# Patient Record
Sex: FEMALE | Race: OTHER | Employment: STUDENT | URBAN - METROPOLITAN AREA
[De-identification: names, ages, dates, MRNs, and addresses within clinical notes are randomized per-mention and may not be internally consistent; named-entity substitution may affect disease eponyms.]

---

## 2018-01-16 ENCOUNTER — OFFICE VISIT (OUTPATIENT)
Dept: FAMILY MEDICINE CLINIC | Facility: CLINIC | Age: 18
End: 2018-01-16

## 2018-01-16 VITALS
BODY MASS INDEX: 23.99 KG/M2 | HEART RATE: 68 BPM | HEIGHT: 65 IN | DIASTOLIC BLOOD PRESSURE: 67 MMHG | WEIGHT: 144 LBS | SYSTOLIC BLOOD PRESSURE: 107 MMHG

## 2018-01-16 DIAGNOSIS — E04.9 GOITER: Primary | ICD-10-CM

## 2018-01-16 DIAGNOSIS — Z00.00 ANNUAL PHYSICAL EXAM: ICD-10-CM

## 2018-01-16 PROCEDURE — 99202 OFFICE O/P NEW SF 15 MIN: CPT | Performed by: FAMILY MEDICINE

## 2018-01-16 PROCEDURE — 99212 OFFICE O/P EST SF 10 MIN: CPT | Performed by: FAMILY MEDICINE

## 2018-01-16 NOTE — PROGRESS NOTES
I did a physical and they said my thyroid was enlarged this year and last year. \"My dad had something wrong with his thyroid. \"  [employer] in [employer]. Periods regular.     Exam      Patient's past medical surgical family social history

## 2018-02-01 ENCOUNTER — HOSPITAL ENCOUNTER (OUTPATIENT)
Dept: ULTRASOUND IMAGING | Age: 18
Discharge: HOME | End: 2018-02-01
Attending: FAMILY MEDICINE
Payer: COMMERCIAL

## 2018-02-01 ENCOUNTER — APPOINTMENT (OUTPATIENT)
Dept: LAB | Age: 18
End: 2018-02-01
Attending: FAMILY MEDICINE
Payer: COMMERCIAL

## 2018-02-01 DIAGNOSIS — E04.9 GOITER: ICD-10-CM

## 2018-02-01 DIAGNOSIS — Z00.00 ANNUAL PHYSICAL EXAM: ICD-10-CM

## 2018-02-01 LAB
CHOLEST SERPL-MCNC: 155 MG/DL (ref 110–200)
GLUCOSE SERPL-MCNC: 81 MG/DL (ref 70–99)
HDLC SERPL-MCNC: 52 MG/DL
LDLC SERPL CALC-MCNC: 91 MG/DL (ref 0–99)
NONHDLC SERPL-MCNC: 103 MG/DL
T3 SERPL-MCNC: 1.05 NG/ML (ref 0.87–1.78)
T3FREE SERPL-MCNC: 3.34 PG/ML (ref 2.53–4.29)
T4 FREE SERPL-MCNC: 0.77 NG/DL (ref 0.58–1.64)
T4 SERPL-MCNC: 6.5 MCG/DL (ref 6.1–12.2)
TRIGL SERPL-MCNC: 61 MG/DL (ref 1–149)
TSH SERPL-ACNC: 0.92 UIU/ML (ref 0.45–5.33)

## 2018-02-01 PROCEDURE — 76536 US EXAM OF HEAD AND NECK: CPT | Performed by: FAMILY MEDICINE

## 2018-02-01 PROCEDURE — 84439 ASSAY OF FREE THYROXINE: CPT

## 2018-02-01 PROCEDURE — 86376 MICROSOMAL ANTIBODY EACH: CPT

## 2018-02-01 PROCEDURE — 84481 FREE ASSAY (FT-3): CPT

## 2018-02-01 PROCEDURE — 36415 COLL VENOUS BLD VENIPUNCTURE: CPT

## 2018-02-01 PROCEDURE — 82947 ASSAY GLUCOSE BLOOD QUANT: CPT

## 2018-02-01 PROCEDURE — 80061 LIPID PANEL: CPT

## 2018-02-01 PROCEDURE — 84443 ASSAY THYROID STIM HORMONE: CPT

## 2018-02-01 PROCEDURE — 84480 ASSAY TRIIODOTHYRONINE (T3): CPT

## 2018-02-01 PROCEDURE — 87389 HIV-1 AG W/HIV-1&-2 AB AG IA: CPT

## 2018-02-02 LAB
HIV1+2 AB SERPL QL IA: NONREACTIVE
THYROPEROXIDASE AB SERPL-ACNC: 0.7 IU/ML (ref 0–9)

## 2018-02-12 ENCOUNTER — OFFICE VISIT (OUTPATIENT)
Dept: FAMILY MEDICINE CLINIC | Facility: CLINIC | Age: 18
End: 2018-02-12

## 2018-02-12 VITALS
TEMPERATURE: 98 F | SYSTOLIC BLOOD PRESSURE: 109 MMHG | RESPIRATION RATE: 18 BRPM | HEART RATE: 59 BPM | DIASTOLIC BLOOD PRESSURE: 62 MMHG | BODY MASS INDEX: 24.22 KG/M2 | WEIGHT: 145.38 LBS | HEIGHT: 65 IN

## 2018-02-12 DIAGNOSIS — L65.9 HAIR LOSS: ICD-10-CM

## 2018-02-12 DIAGNOSIS — E04.2 MULTINODULAR GOITER: Primary | ICD-10-CM

## 2018-02-12 PROCEDURE — 99213 OFFICE O/P EST LOW 20 MIN: CPT | Performed by: FAMILY MEDICINE

## 2018-02-12 PROCEDURE — 99212 OFFICE O/P EST SF 10 MIN: CPT | Performed by: FAMILY MEDICINE

## 2018-02-12 NOTE — PROGRESS NOTES
Some hair loss  \"Its coming out in clumps. \"  Happened about a month ago. Pt has multinodular goiter with normal thyroid studies. Exam  Hair normal  Thyroid questionably enlarged. A/p  multinodular goiter  F/u 1 yea    Hair loss. Refer to derm.

## 2018-08-15 ENCOUNTER — TELEPHONE (OUTPATIENT)
Dept: FAMILY MEDICINE CLINIC | Facility: CLINIC | Age: 18
End: 2018-08-15

## 2018-08-15 DIAGNOSIS — E04.9 GOITER: Primary | ICD-10-CM

## 2018-08-15 NOTE — TELEPHONE ENCOUNTER
[facility] outpatient lab calling about an order not in system for Pt to get her thyroid checked.

## 2018-08-15 NOTE — TELEPHONE ENCOUNTER
Aia 16 please advise, last thyroid level 02/01/18 was normal and note indicates pt to return in 1 year.

## 2018-08-16 ENCOUNTER — TELEPHONE (OUTPATIENT)
Dept: FAMILY MEDICINE CLINIC | Facility: CLINIC | Age: 18
End: 2018-08-16

## 2018-08-16 NOTE — TELEPHONE ENCOUNTER
Pt is calling to ask if she can get a note stating why she didn't need a pathology report. pls advise.  Thank you

## 2018-08-16 NOTE — TELEPHONE ENCOUNTER
Patients [employer]  is requesting a letter stating that thyroid is not going to have an issue.  asked why a biopsy was not done. Please advise.

## 2018-08-17 ENCOUNTER — TELEPHONE (OUTPATIENT)
Dept: FAMILY MEDICINE CLINIC | Facility: CLINIC | Age: 18
End: 2018-08-17

## 2018-08-17 NOTE — TELEPHONE ENCOUNTER
Patient requesting to  copy of 02/01/2018 lab results from [address] [place] [address] office today if possible.      Patient is having Annika Liraes this up

## 2018-08-17 NOTE — TELEPHONE ENCOUNTER
Lab results printed and placed at  for  c/o Sgvani Lopez. Patient called and LMTCB for any further questions.

## 2018-12-27 ENCOUNTER — HOSPITAL ENCOUNTER (OUTPATIENT)
Age: 18
Discharge: HOME | End: 2018-12-27
Payer: COMMERCIAL

## 2018-12-27 VITALS
BODY MASS INDEX: 23.9 KG/M2 | OXYGEN SATURATION: 99 % | DIASTOLIC BLOOD PRESSURE: 62 MMHG | WEIGHT: 140 LBS | SYSTOLIC BLOOD PRESSURE: 115 MMHG | RESPIRATION RATE: 16 BRPM | TEMPERATURE: 98 F | HEART RATE: 62 BPM | HEIGHT: 64 IN

## 2018-12-27 DIAGNOSIS — R19.7 DIARRHEA, UNSPECIFIED TYPE: Primary | ICD-10-CM

## 2018-12-27 PROCEDURE — 81025 URINE PREGNANCY TEST: CPT

## 2018-12-27 PROCEDURE — 81003 URINALYSIS AUTO W/O SCOPE: CPT

## 2018-12-27 PROCEDURE — 99204 OFFICE O/P NEW MOD 45 MIN: CPT

## 2018-12-27 PROCEDURE — 99213 OFFICE O/P EST LOW 20 MIN: CPT

## 2018-12-27 RX ORDER — ONDANSETRON 4 MG/1
4 TABLET, ORALLY DISINTEGRATING ORAL ONCE
Status: COMPLETED | OUTPATIENT
Start: 2018-12-27 | End: 2018-12-27

## 2018-12-27 NOTE — ED INITIAL ASSESSMENT (HPI)
Mid abdominal pain for a few days, pain is mild now, reports diarrhea today. No dizziness or blood in stool. Reports slight nausea.  Periods have irregular since October

## 2018-12-27 NOTE — ED PROVIDER NOTES
Patient presents with:  Abdomen/Flank Pain (GI/)      HPI:     Chandler Alberts is a 19year old female who presents for evaluation of a chief complaint of GI distress.   Patient states she woke up with some upper epigastric discomfort and had a couple e Reviewed.       Physical Exam:     /62   Pulse 62   Temp 98.4 °F (36.9 °C) (Oral)   Resp 16   Ht 162.6 cm (5' 4\")   Wt 63.5 kg   LMP 12/27/2018   SpO2 99%   BMI 24.03 kg/m²   GENERAL: well developed, well nourished, well hydrated, no distress  SKIN: infection. The patient is aware. She was given Zofran and is feeling better. She states the pain to the epigastric area is intermittent.   She is aware this is most likely a virus, however if she develops any increasing abdominal pain, intractable vomiti

## 2020-03-25 ENCOUNTER — NURSE TRIAGE (OUTPATIENT)
Dept: FAMILY MEDICINE CLINIC | Facility: CLINIC | Age: 20
End: 2020-03-25

## 2020-03-25 NOTE — TELEPHONE ENCOUNTER
Action Requested: Summary for Provider     []  Critical Lab, Recommendations Needed  [] Need Additional Advice  []   FYI    []   Need Orders  [] Need Medications Sent to Pharmacy  []  Other     SUMMARY:  Patient states for the last month having left lower

## 2020-06-07 ENCOUNTER — LAB ENCOUNTER (OUTPATIENT)
Dept: LAB | Facility: HOSPITAL | Age: 20
End: 2020-06-07
Attending: OBSTETRICS & GYNECOLOGY
Payer: COMMERCIAL

## 2020-06-07 DIAGNOSIS — O20.0 THREATENED ABORTION: ICD-10-CM

## 2020-06-07 DIAGNOSIS — O20.0 THREATENED ABORTION: Primary | ICD-10-CM

## 2020-06-07 PROCEDURE — 86900 BLOOD TYPING SEROLOGIC ABO: CPT

## 2020-06-07 PROCEDURE — 84144 ASSAY OF PROGESTERONE: CPT

## 2020-06-07 PROCEDURE — 86850 RBC ANTIBODY SCREEN: CPT

## 2020-06-07 PROCEDURE — 84702 CHORIONIC GONADOTROPIN TEST: CPT

## 2020-06-07 PROCEDURE — 36415 COLL VENOUS BLD VENIPUNCTURE: CPT

## 2020-06-07 PROCEDURE — 86901 BLOOD TYPING SEROLOGIC RH(D): CPT

## 2020-08-12 ENCOUNTER — HOSPITAL ENCOUNTER (OUTPATIENT)
Age: 20
Discharge: HOME | End: 2020-08-12
Attending: EMERGENCY MEDICINE
Payer: COMMERCIAL

## 2020-08-12 VITALS
RESPIRATION RATE: 18 BRPM | WEIGHT: 145 LBS | BODY MASS INDEX: 24.16 KG/M2 | SYSTOLIC BLOOD PRESSURE: 124 MMHG | OXYGEN SATURATION: 98 % | HEIGHT: 65 IN | TEMPERATURE: 98 F | HEART RATE: 63 BPM | DIASTOLIC BLOOD PRESSURE: 71 MMHG

## 2020-08-12 DIAGNOSIS — Z20.822 EXPOSURE TO COVID-19 VIRUS: ICD-10-CM

## 2020-08-12 DIAGNOSIS — J02.0 STREPTOCOCCAL SORE THROAT: Primary | ICD-10-CM

## 2020-08-12 LAB — S PYO AG THROAT QL: POSITIVE

## 2020-08-12 PROCEDURE — 99213 OFFICE O/P EST LOW 20 MIN: CPT | Performed by: EMERGENCY MEDICINE

## 2020-08-12 PROCEDURE — U0003 INFECTIOUS AGENT DETECTION BY NUCLEIC ACID (DNA OR RNA); SEVERE ACUTE RESPIRATORY SYNDROME CORONAVIRUS 2 (SARS-COV-2) (CORONAVIRUS DISEASE [COVID-19]), AMPLIFIED PROBE TECHNIQUE, MAKING USE OF HIGH THROUGHPUT TECHNOLOGIES AS DESCRIBED BY CMS-2020-01-R: HCPCS | Performed by: EMERGENCY MEDICINE

## 2020-08-12 PROCEDURE — 87430 STREP A AG IA: CPT | Performed by: EMERGENCY MEDICINE

## 2020-08-12 RX ORDER — AMOXICILLIN 875 MG/1
875 TABLET, COATED ORAL 2 TIMES DAILY
Qty: 20 TABLET | Refills: 0 | Status: SHIPPED | OUTPATIENT
Start: 2020-08-12 | End: 2020-08-22

## 2020-08-12 NOTE — ED PROVIDER NOTES
Patient Seen in: [facility] In [address] [place] [address]    History   Patient presents with: Tonsillitis    Stated Complaint: sore throat, Swollen Tonsils    HPI    Patient here complaining of sore throat for 6 days.   Notes pain is described as sor bilateral  SKIN: good skin turgor, no obvious rashes  NECK: supple, no meningeal signs, b/l tender ant.  lymphadenopathy  CARDIO: Regular without murmur  EXTREMITIES: no cyanosis, clubbing or edema  GI: soft, non-tender, normal bowel sounds    DDX: pharyng

## 2020-08-12 NOTE — ED INITIAL ASSESSMENT (HPI)
Pt complaining of swollen tonsils for 2 weeks. No fever. Pt had a sore throat but that has subsided.

## 2020-08-13 LAB — SARS-COV-2 RNA RESP QL NAA+PROBE: NOT DETECTED

## 2020-11-24 ENCOUNTER — HOSPITAL ENCOUNTER (OUTPATIENT)
Age: 20
Discharge: HOME | End: 2020-11-24
Attending: EMERGENCY MEDICINE
Payer: COMMERCIAL

## 2020-11-24 VITALS
DIASTOLIC BLOOD PRESSURE: 57 MMHG | SYSTOLIC BLOOD PRESSURE: 117 MMHG | BODY MASS INDEX: 24.16 KG/M2 | HEART RATE: 63 BPM | HEIGHT: 65 IN | TEMPERATURE: 98 F | RESPIRATION RATE: 18 BRPM | OXYGEN SATURATION: 99 % | WEIGHT: 145 LBS

## 2020-11-24 DIAGNOSIS — Z20.822 ENCOUNTER FOR LABORATORY TESTING FOR COVID-19 VIRUS: Primary | ICD-10-CM

## 2020-11-24 DIAGNOSIS — J02.9 VIRAL PHARYNGITIS: ICD-10-CM

## 2020-11-24 PROCEDURE — 99213 OFFICE O/P EST LOW 20 MIN: CPT | Performed by: EMERGENCY MEDICINE

## 2020-11-24 PROCEDURE — 87880 STREP A ASSAY W/OPTIC: CPT | Performed by: EMERGENCY MEDICINE

## 2020-11-24 NOTE — ED PROVIDER NOTES
Patient Seen in: [facility]      History   Patient presents with:  Testing: Strep test? - Entered by patient    Stated Complaint: Sore Throat - Strep test?    HPI    Patient is a 22-year-old female who presents to immediate care with a sore t HENT:      Right Ear: Tympanic membrane normal.      Left Ear: Tympanic membrane normal.      Nose: Nose normal.      Mouth/Throat:      Mouth: Mucous membranes are moist.   Eyes:      Extraocular Movements: Extraocular movements intact.       Pupils: Pup

## 2021-06-04 ENCOUNTER — HOSPITAL ENCOUNTER (OUTPATIENT)
Age: 21
Discharge: HOME | End: 2021-06-04
Payer: COMMERCIAL

## 2021-06-04 VITALS
DIASTOLIC BLOOD PRESSURE: 80 MMHG | SYSTOLIC BLOOD PRESSURE: 127 MMHG | TEMPERATURE: 98 F | RESPIRATION RATE: 16 BRPM | HEIGHT: 65 IN | WEIGHT: 158 LBS | BODY MASS INDEX: 26.33 KG/M2 | HEART RATE: 63 BPM | OXYGEN SATURATION: 100 %

## 2021-06-04 DIAGNOSIS — R09.81 NASAL CONGESTION: ICD-10-CM

## 2021-06-04 DIAGNOSIS — J02.9 ACUTE PHARYNGITIS, UNSPECIFIED ETIOLOGY: Primary | ICD-10-CM

## 2021-06-04 PROCEDURE — 99213 OFFICE O/P EST LOW 20 MIN: CPT | Performed by: NURSE PRACTITIONER

## 2021-06-04 PROCEDURE — 87880 STREP A ASSAY W/OPTIC: CPT | Performed by: NURSE PRACTITIONER

## 2021-06-04 PROCEDURE — 36415 COLL VENOUS BLD VENIPUNCTURE: CPT | Performed by: NURSE PRACTITIONER

## 2021-06-04 PROCEDURE — 86308 HETEROPHILE ANTIBODY SCREEN: CPT | Performed by: NURSE PRACTITIONER

## 2021-06-04 NOTE — ED PROVIDER NOTES
Patient Seen in: [facility]      History   Patient presents with:  Sore Throat    Stated Complaint: cough running nose sore throat fatigue    HPI/Subjective:   HPI    This is a 61-year-old female presenting with cough runny nose sore throat a Rate and Rhythm: Normal rate. Heart sounds: Normal heart sounds. Pulmonary:      Effort: Pulmonary effort is normal.      Breath sounds: Normal breath sounds. Abdominal:      Palpations: Abdomen is soft.    Musculoskeletal:         General: Ole Hedrick medications for this patient.

## 2022-11-10 ENCOUNTER — HOSPITAL ENCOUNTER (OUTPATIENT)
Age: 22
Discharge: HOME | End: 2022-11-10
Payer: COMMERCIAL

## 2022-11-10 ENCOUNTER — TELEMEDICINE (OUTPATIENT)
Dept: TELEHEALTH | Age: 22
End: 2022-11-10

## 2022-11-10 VITALS
SYSTOLIC BLOOD PRESSURE: 118 MMHG | RESPIRATION RATE: 18 BRPM | HEART RATE: 69 BPM | OXYGEN SATURATION: 99 % | DIASTOLIC BLOOD PRESSURE: 67 MMHG | TEMPERATURE: 98 F

## 2022-11-10 DIAGNOSIS — Z02.9 ENCOUNTERS FOR ADMINISTRATIVE PURPOSE: Primary | ICD-10-CM

## 2022-11-10 DIAGNOSIS — R10.13 EPIGASTRIC PAIN: Primary | ICD-10-CM

## 2022-11-10 LAB
#MXD IC: 0.4 X10ˆ3/UL (ref 0.1–1)
B-HCG UR QL: NEGATIVE
BILIRUB UR QL STRIP: NEGATIVE
BUN BLD-MCNC: 21 MG/DL (ref 7–18)
CHLORIDE BLD-SCNC: 105 MMOL/L (ref 98–112)
CLARITY UR: CLEAR
CO2 BLD-SCNC: 26 MMOL/L (ref 21–32)
COLOR UR: YELLOW
CREAT BLD-MCNC: 0.8 MG/DL
GFR SERPLBLD BASED ON 1.73 SQ M-ARVRAT: 105 ML/MIN/1.73M2 (ref 60–?)
GLUCOSE BLD-MCNC: 88 MG/DL (ref 70–99)
GLUCOSE UR STRIP-MCNC: NEGATIVE MG/DL
HCT VFR BLD AUTO: 42.1 %
HCT VFR BLD CALC: 46 %
HGB BLD-MCNC: 14.2 G/DL
ISTAT IONIZED CALCIUM FOR CHEM 8: 1.1 MMOL/L (ref 1.12–1.32)
LEUKOCYTE ESTERASE UR QL STRIP: NEGATIVE
LYMPHOCYTES # BLD AUTO: 1.2 X10ˆ3/UL (ref 1–4)
LYMPHOCYTES NFR BLD AUTO: 16.8 %
MCH RBC QN AUTO: 30.5 PG (ref 26–34)
MCHC RBC AUTO-ENTMCNC: 33.7 G/DL (ref 31–37)
MCV RBC AUTO: 90.5 FL (ref 80–100)
MIXED CELL %: 5.8 %
NEUTROPHILS # BLD AUTO: 5.6 X10ˆ3/UL (ref 1.5–7.7)
NEUTROPHILS NFR BLD AUTO: 77.4 %
NITRITE UR QL STRIP: NEGATIVE
PH UR STRIP: 5.5 [PH]
PLATELET # BLD AUTO: 199 X10ˆ3/UL (ref 150–450)
POTASSIUM BLD-SCNC: 4.6 MMOL/L (ref 3.6–5.1)
PROT UR STRIP-MCNC: NEGATIVE MG/DL
RBC # BLD AUTO: 4.65 X10ˆ6/UL
SODIUM BLD-SCNC: 139 MMOL/L (ref 136–145)
SP GR UR STRIP: 1.02
UROBILINOGEN UR STRIP-ACNC: <2 MG/DL
WBC # BLD AUTO: 7.2 X10ˆ3/UL (ref 4–11)

## 2022-11-10 PROCEDURE — 81002 URINALYSIS NONAUTO W/O SCOPE: CPT

## 2022-11-10 PROCEDURE — 99214 OFFICE O/P EST MOD 30 MIN: CPT

## 2022-11-10 PROCEDURE — 80047 BASIC METABLC PNL IONIZED CA: CPT

## 2022-11-10 PROCEDURE — 96361 HYDRATE IV INFUSION ADD-ON: CPT

## 2022-11-10 PROCEDURE — 81025 URINE PREGNANCY TEST: CPT

## 2022-11-10 PROCEDURE — 96375 TX/PRO/DX INJ NEW DRUG ADDON: CPT

## 2022-11-10 PROCEDURE — 85025 COMPLETE CBC W/AUTO DIFF WBC: CPT | Performed by: NURSE PRACTITIONER

## 2022-11-10 PROCEDURE — 96374 THER/PROPH/DIAG INJ IV PUSH: CPT

## 2022-11-10 PROCEDURE — S0028 INJECTION, FAMOTIDINE, 20 MG: HCPCS

## 2022-11-10 RX ORDER — FAMOTIDINE 10 MG/ML
20 INJECTION, SOLUTION INTRAVENOUS ONCE
Status: COMPLETED | OUTPATIENT
Start: 2022-11-10 | End: 2022-11-10

## 2022-11-10 RX ORDER — SODIUM CHLORIDE 9 MG/ML
1000 INJECTION, SOLUTION INTRAVENOUS ONCE
Status: COMPLETED | OUTPATIENT
Start: 2022-11-10 | End: 2022-11-10

## 2022-11-10 RX ORDER — ONDANSETRON 2 MG/ML
4 INJECTION INTRAMUSCULAR; INTRAVENOUS ONCE
Status: COMPLETED | OUTPATIENT
Start: 2022-11-10 | End: 2022-11-10

## 2022-11-10 NOTE — DISCHARGE INSTRUCTIONS
Please push fluids and make sure you are staying hydrated. [place]le[place] diet. Close follow-up with your primary care provider as recommended. Any fever, worsening pain or any other concerns please go to the emergency department.

## 2022-11-10 NOTE — ED INITIAL ASSESSMENT (HPI)
Pt presents with upper left-sided abdominal pain with nausea x 2 days. Emesis x 1 today. \"Stomach feels like it is cramping and bloating anytime I eat\", per pt. Pt reports Sore throat and cough x 10 days.

## 2024-03-13 ENCOUNTER — APPOINTMENT (OUTPATIENT)
Dept: GENERAL RADIOLOGY | Age: 24
End: 2024-03-13
Attending: PHYSICIAN ASSISTANT
Payer: COMMERCIAL

## 2024-03-13 ENCOUNTER — HOSPITAL ENCOUNTER (OUTPATIENT)
Age: 24
Discharge: HOME | End: 2024-03-13
Payer: COMMERCIAL

## 2024-03-13 VITALS
TEMPERATURE: 98 F | SYSTOLIC BLOOD PRESSURE: 133 MMHG | OXYGEN SATURATION: 100 % | DIASTOLIC BLOOD PRESSURE: 72 MMHG | RESPIRATION RATE: 18 BRPM | HEART RATE: 70 BPM

## 2024-03-13 DIAGNOSIS — S93.402A SPRAIN OF LEFT ANKLE, UNSPECIFIED LIGAMENT, INITIAL ENCOUNTER: Primary | ICD-10-CM

## 2024-03-13 PROCEDURE — 99213 OFFICE O/P EST LOW 20 MIN: CPT | Performed by: PHYSICIAN ASSISTANT

## 2024-03-13 PROCEDURE — 73610 X-RAY EXAM OF ANKLE: CPT | Performed by: PHYSICIAN ASSISTANT

## 2024-03-13 NOTE — ED PROVIDER NOTES
Patient Seen in: [facility]      History     Chief Complaint   Patient presents with    Ankle Pain     Stated Complaint: Sprain, Minor - Sprained ankle    Subjective:   HPI    23-year-old female presenting for evaluation of left ankle pain, swelling and bruising.  Patient had an inversion injury 3 days ago while exercising.  Came to IC today due to swelling and bruising which she noted on the toes and the medial aspect of the ankle.  She has been ambulatory.  She is wearing a brace for comfort    Objective:   Past Medical History:   Diagnosis Date    Goiter, non-toxic               Past Surgical History:   Procedure Laterality Date    DILATION/CURETTAGE,DIAGNOSTIC                  Social History     Socioeconomic History    Marital status: Single   Tobacco Use    Smoking status: Never    Smokeless tobacco: Never   Substance and Sexual Activity    Alcohol use: No    Drug use: No              Review of Systems    Positive for stated complaint: Sprain, Minor - Sprained ankle  Other systems are as noted in HPI.  Constitutional and vital signs reviewed.      All other systems reviewed and negative except as noted above.    Physical Exam     ED Triage Vitals [03/13/24 1312]   /72   Pulse 70   Resp 18   Temp 98.2 °F (36.8 °C)   Temp src Temporal   SpO2 100 %   O2 Device None (Room air)       Current:/72   Pulse 70   Temp 98.2 °F (36.8 °C) (Temporal)   Resp 18   LMP 03/11/2024 (Exact Date)   SpO2 100%         Physical Exam  Vitals and nursing note reviewed.   Constitutional:       General: She is not in acute distress.  HENT:      Head: Normocephalic and atraumatic.      Right Ear: External ear normal.      Left Ear: External ear normal.      Nose: Nose normal.      Mouth/Throat:      Mouth: Mucous membranes are moist.   Eyes:      Extraocular Movements: Extraocular movements intact.      Pupils: Pupils are equal, round, and reactive to light.   Cardiovascular:      Rate and Rhythm: Normal  rate.   Pulmonary:      Effort: Pulmonary effort is normal.   Abdominal:      General: Abdomen is flat.   Musculoskeletal:         General: Normal range of motion.      Cervical back: Normal range of motion.      Left foot: Swelling present.      Comments: Ecchymosis noted to medial and lateral aspect of L foot  2+ dp pulse, brisk cap refill   Skin:     General: Skin is warm.   Neurological:      General: No focal deficit present.      Mental Status: She is alert and oriented to person, place, and time.   Psychiatric:         Mood and Affect: Mood normal.         Behavior: Behavior normal.               ED Course   Labs Reviewed - No data to display      22 yo female here with c/o L ankle swelling and bruising after an injury 3 days ago.    Ddx- sprain, fx, contusion    Xray negative for fracture. He is neurovascularly intact.     Sprain precautions, pcp follow up            MDM                                         Medical Decision Making      Disposition and Plan     Clinical Impression:  1. Sprain of left ankle, unspecified ligament, initial encounter         Disposition:  Discharge  3/13/2024  2:13 pm    Follow-up:  No follow-up provider specified.        Medications Prescribed:  There are no discharge medications for this patient.

## 2024-03-13 NOTE — ED INITIAL ASSESSMENT (HPI)
Pt with L ankle pain. Pt stated that she sprained it while exercising on Sunday. Pt rated pain 4/10.

## 2025-06-18 ENCOUNTER — OFFICE VISIT (OUTPATIENT)
Dept: INTERNAL MEDICINE CLINIC | Facility: CLINIC | Age: 25
End: 2025-06-18
Payer: MEDICAID

## 2025-06-18 VITALS
DIASTOLIC BLOOD PRESSURE: 70 MMHG | OXYGEN SATURATION: 98 % | BODY MASS INDEX: 25.94 KG/M2 | HEIGHT: 64.5 IN | WEIGHT: 153.81 LBS | HEART RATE: 75 BPM | TEMPERATURE: 98 F | SYSTOLIC BLOOD PRESSURE: 128 MMHG

## 2025-06-18 DIAGNOSIS — Z11.3 SCREENING EXAMINATION FOR STD (SEXUALLY TRANSMITTED DISEASE): ICD-10-CM

## 2025-06-18 DIAGNOSIS — N89.8 VAGINAL DISCHARGE: ICD-10-CM

## 2025-06-18 DIAGNOSIS — Z00.00 ANNUAL PHYSICAL EXAM: Primary | ICD-10-CM

## 2025-06-18 DIAGNOSIS — E04.2 MULTIPLE THYROID NODULES: ICD-10-CM

## 2025-06-18 DIAGNOSIS — Z12.4 CERVICAL CANCER SCREENING: ICD-10-CM

## 2025-06-18 PROCEDURE — 81514 NFCT DS BV&VAGINITIS DNA ALG: CPT | Performed by: INTERNAL MEDICINE

## 2025-06-18 PROCEDURE — 87591 N.GONORRHOEAE DNA AMP PROB: CPT | Performed by: INTERNAL MEDICINE

## 2025-06-18 PROCEDURE — 99385 PREV VISIT NEW AGE 18-39: CPT | Performed by: INTERNAL MEDICINE

## 2025-06-18 PROCEDURE — 87491 CHLMYD TRACH DNA AMP PROBE: CPT | Performed by: INTERNAL MEDICINE

## 2025-06-18 NOTE — H&P
Subjective:   Neyda Evans is a 25 year old female  who presents for Physical (Reviewed Preventative/Wellness form with patient. /)     The following individual(s) verbally consented to be recorded using ambient AI listening technology and understand that they can each withdraw their consent to this listening technology at any point by asking the clinician to turn off or pause the recording:    Patient name: Neyda Evans    History of Present Illness  Neyda Evans is a 25 year old female who presents for a routine check-up and STD screening.    She has a regular menstrual cycle, with the first day of her last cycle on May 24, 2025. She uses condoms for birth control. She seeks STD screening. There is no history of sexually transmitted diseases or vaginal issues.    She has hx of multiple tiny nodules identified on an ultrasound in 2018.     Her family history includes her mother with breast cancer and diabetes. There is no known family history of colon cancer. She is unsure if her mother underwent BRCA testing. She reports no breast or nipple changes and performs breast self-exams at home without noticing any changes.    History/Other:    Chief Complaint Reviewed and Verified  Nursing Notes Reviewed and   Verified  Tobacco Reviewed  Allergies Reviewed  Medications Reviewed    Problem List Reviewed  Medical History Reviewed  Surgical History   Reviewed  OB Status Reviewed  Family History Reviewed  Social History   Reviewed         Current Medications[1]    Review of Systems:  Pertinent items are noted in HPI.  A comprehensive 10 point review of systems was completed.  Pertinent positives and negatives noted in the the HPI.        Objective:   /70 (BP Location: Right arm, Patient Position: Sitting, Cuff Size: adult)   Pulse 75   Temp 98.1 °F (36.7 °C) (Temporal)   Ht 5' 4.5\" (1.638 m)   Wt 153 lb 12.8 oz (69.8 kg)   LMP 05/24/2025 (Exact Date)   SpO2 98%   BMI 25.99 kg/m²   Estimated body mass index is 25.99 kg/m² as calculated from the following:    Height as of this encounter: 5' 4.5\" (1.638 m).    Weight as of this encounter: 153 lb 12.8 oz (69.8 kg).  PHYSICAL EXAM:   General: no acute distress   Eyes: pupils equal and reactive; EOMI; sclera normal; conjunctiva normal   ENT:sinuses non-tender on palpation; without erythema or exudate  Neck: trachea midline; no adenopathy; thyroid not enlarged   Hematologic/lymphatic:no cervical lymphadenopathy; no supraclavicular adenopathy   Respiratory: no increased work of breathing; good air exchange; CTAB; no crackles or wheezing   Cardiovascular: RRR; S1, S2; no murmurs; no edema   Gastrointestinal: normal bowel sounds; soft; non-distended; non-tender  Neurological: awake, alert, oriented x3; CNII-XII grossly intact;  MSK: full ROM; strength 5/5  Behavioral/Psych: euthymic; appropriate affect   Breasts: symmetrical; no masses or nipple discharge or rashes/lesions noted   : no external vaginal lesions noted. Mild discharge; cervical os visualized. No pain on exam     Physical Exam      Assessment & Plan:       Assessment & Plan  Thyroid nodules  - Order thyroid function tests   - Order thyroid ultrasound.    Sexually transmitted infection screening  Requested STI screening. Informed about testing methods for gonorrhea and chlamydia.  - Order HIV and syphilis blood work.  - Perform gonorrhea and chlamydia testing with Pap smear.    Cervical cancer screening  - Performed Pap smear.  - Discuss Pap smear frequency and follow-up protocol.        General Health Maintenance  Uncertain about HPV vaccination status. Likely received tetanus booster during  service. Discussed general health screenings and vaccinations.  - Check records for HPV and tdap vaccination status.  Breast cancer screening  Family history of breast cancer. No personal history. Uncertain about mother's BRCA status.   - Recommend asking mother about BRCA testing status.  -  Advise considering genetic counseling especially if mother's BRCA test is positive.  - Discuss mammogram screening starting at age 40, sooner if new issues arise or genetic predisposition is confirmed.      This note was created utilizing [employer] speech recognition software which may lead to grammatical errors/typos.   If any word or phrase is confusing, it is likely due to recognition error. Please ask the provider for clarification.      Zuleika Van MD       [1]   No current outpatient medications on file.

## 2025-06-19 ENCOUNTER — LAB ENCOUNTER (OUTPATIENT)
Dept: LAB | Age: 25
End: 2025-06-19
Attending: INTERNAL MEDICINE
Payer: MEDICAID

## 2025-06-19 DIAGNOSIS — E04.2 MULTIPLE THYROID NODULES: ICD-10-CM

## 2025-06-19 DIAGNOSIS — Z00.00 ANNUAL PHYSICAL EXAM: ICD-10-CM

## 2025-06-19 DIAGNOSIS — Z11.3 SCREENING EXAMINATION FOR STD (SEXUALLY TRANSMITTED DISEASE): ICD-10-CM

## 2025-06-19 LAB
ALBUMIN SERPL-MCNC: 4.8 G/DL (ref 3.2–4.8)
ALBUMIN/GLOB SERPL: 2 {RATIO} (ref 1–2)
ALP LIVER SERPL-CCNC: 55 U/L (ref 37–98)
ALT SERPL-CCNC: 21 U/L (ref 10–49)
ANION GAP SERPL CALC-SCNC: 8 MMOL/L (ref 0–18)
AST SERPL-CCNC: 25 U/L (ref ?–34)
BASOPHILS # BLD AUTO: 0.05 X10(3) UL (ref 0–0.2)
BASOPHILS NFR BLD AUTO: 0.6 %
BILIRUB SERPL-MCNC: 0.7 MG/DL (ref 0.3–1.2)
BUN BLD-MCNC: 13 MG/DL (ref 9–23)
BUN/CREAT SERPL: 13.4 (ref 10–20)
BV BACTERIA DNA VAG QL NAA+PROBE: POSITIVE
C GLABRATA DNA VAG QL NAA+PROBE: NEGATIVE
C KRUSEI DNA VAG QL NAA+PROBE: NEGATIVE
C TRACH DNA SPEC QL NAA+PROBE: NEGATIVE
CALCIUM BLD-MCNC: 9.5 MG/DL (ref 8.7–10.4)
CANDIDA DNA VAG QL NAA+PROBE: NEGATIVE
CHLORIDE SERPL-SCNC: 105 MMOL/L (ref 98–112)
CHOLEST SERPL-MCNC: 152 MG/DL (ref ?–200)
CO2 SERPL-SCNC: 25 MMOL/L (ref 21–32)
CREAT BLD-MCNC: 0.97 MG/DL (ref 0.55–1.02)
DEPRECATED RDW RBC AUTO: 43.2 FL (ref 35.1–46.3)
EGFRCR SERPLBLD CKD-EPI 2021: 82 ML/MIN/1.73M2 (ref 60–?)
EOSINOPHIL # BLD AUTO: 0.21 X10(3) UL (ref 0–0.7)
EOSINOPHIL NFR BLD AUTO: 2.6 %
ERYTHROCYTE [DISTWIDTH] IN BLOOD BY AUTOMATED COUNT: 12.5 % (ref 11–15)
EST. AVERAGE GLUCOSE BLD GHB EST-MCNC: 100 MG/DL (ref 68–126)
FASTING PATIENT LIPID ANSWER: YES
FASTING STATUS PATIENT QL REPORTED: YES
GLOBULIN PLAS-MCNC: 2.4 G/DL (ref 2–3.5)
GLUCOSE BLD-MCNC: 89 MG/DL (ref 70–99)
HBA1C MFR BLD: 5.1 % (ref ?–5.7)
HCT VFR BLD AUTO: 38.9 % (ref 35–48)
HDLC SERPL-MCNC: 60 MG/DL (ref 40–59)
HGB BLD-MCNC: 13.1 G/DL (ref 12–16)
IMM GRANULOCYTES # BLD AUTO: 0.01 X10(3) UL (ref 0–1)
IMM GRANULOCYTES NFR BLD: 0.1 %
LDLC SERPL CALC-MCNC: 75 MG/DL (ref ?–100)
LYMPHOCYTES # BLD AUTO: 2.46 X10(3) UL (ref 1–4)
LYMPHOCYTES NFR BLD AUTO: 30.4 %
MCH RBC QN AUTO: 31.6 PG (ref 26–34)
MCHC RBC AUTO-ENTMCNC: 33.7 G/DL (ref 31–37)
MCV RBC AUTO: 93.7 FL (ref 80–100)
MONOCYTES # BLD AUTO: 0.55 X10(3) UL (ref 0.1–1)
MONOCYTES NFR BLD AUTO: 6.8 %
N GONORRHOEA DNA SPEC QL NAA+PROBE: NEGATIVE
NEUTROPHILS # BLD AUTO: 4.81 X10 (3) UL (ref 1.5–7.7)
NEUTROPHILS # BLD AUTO: 4.81 X10(3) UL (ref 1.5–7.7)
NEUTROPHILS NFR BLD AUTO: 59.5 %
NONHDLC SERPL-MCNC: 92 MG/DL (ref ?–130)
OSMOLALITY SERPL CALC.SUM OF ELEC: 286 MOSM/KG (ref 275–295)
PLATELET # BLD AUTO: 223 10(3)UL (ref 150–450)
POTASSIUM SERPL-SCNC: 4 MMOL/L (ref 3.5–5.1)
PROT SERPL-MCNC: 7.2 G/DL (ref 5.7–8.2)
RBC # BLD AUTO: 4.15 X10(6)UL (ref 3.8–5.3)
SODIUM SERPL-SCNC: 138 MMOL/L (ref 136–145)
T PALLIDUM AB SER QL IA: NONREACTIVE
T VAGINALIS DNA VAG QL NAA+PROBE: NEGATIVE
T3FREE SERPL-MCNC: 3.09 PG/ML (ref 2.4–4.2)
T4 FREE SERPL-MCNC: 0.9 NG/DL (ref 0.8–1.7)
TRIGL SERPL-MCNC: 89 MG/DL (ref 30–149)
TSI SER-ACNC: 2.52 UIU/ML (ref 0.55–4.78)
VIT D+METAB SERPL-MCNC: 30 NG/ML (ref 30–100)
VLDLC SERPL CALC-MCNC: 14 MG/DL (ref 0–30)
WBC # BLD AUTO: 8.1 X10(3) UL (ref 4–11)

## 2025-06-19 PROCEDURE — 87389 HIV-1 AG W/HIV-1&-2 AB AG IA: CPT

## 2025-06-19 PROCEDURE — 84439 ASSAY OF FREE THYROXINE: CPT

## 2025-06-19 PROCEDURE — 82306 VITAMIN D 25 HYDROXY: CPT

## 2025-06-19 PROCEDURE — 84481 FREE ASSAY (FT-3): CPT

## 2025-06-19 PROCEDURE — 83036 HEMOGLOBIN GLYCOSYLATED A1C: CPT

## 2025-06-19 PROCEDURE — 80061 LIPID PANEL: CPT

## 2025-06-19 PROCEDURE — 36415 COLL VENOUS BLD VENIPUNCTURE: CPT

## 2025-06-19 PROCEDURE — 84443 ASSAY THYROID STIM HORMONE: CPT

## 2025-06-19 PROCEDURE — 85025 COMPLETE CBC W/AUTO DIFF WBC: CPT

## 2025-06-19 PROCEDURE — 80053 COMPREHEN METABOLIC PANEL: CPT

## 2025-06-19 PROCEDURE — 86780 TREPONEMA PALLIDUM: CPT

## 2025-06-24 LAB — HPV E6+E7 MRNA CVX QL NAA+PROBE: NEGATIVE

## 2025-06-30 ENCOUNTER — HOSPITAL ENCOUNTER (OUTPATIENT)
Dept: ULTRASOUND IMAGING | Age: 25
Discharge: HOME | End: 2025-06-30
Attending: INTERNAL MEDICINE
Payer: MEDICAID

## 2025-06-30 DIAGNOSIS — E04.2 MULTIPLE THYROID NODULES: ICD-10-CM

## 2025-06-30 PROCEDURE — 76536 US EXAM OF HEAD AND NECK: CPT | Performed by: INTERNAL MEDICINE

## (undated) NOTE — LETTER
Date & Time: 6/4/2021, 1:55 PM  Patient: Signe Moritz  Encounter Provider(s):    SAMUEL Gagnon       To Whom It May Concern:    Signe Moritz was seen and treated in our department on 6/4/2021.  She should not return to work until 06/07/